# Patient Record
Sex: MALE | Race: WHITE
[De-identification: names, ages, dates, MRNs, and addresses within clinical notes are randomized per-mention and may not be internally consistent; named-entity substitution may affect disease eponyms.]

---

## 2017-07-20 ENCOUNTER — HOSPITAL ENCOUNTER (EMERGENCY)
Dept: HOSPITAL 62 - ER | Age: 42
Discharge: HOME | End: 2017-07-20
Payer: SELF-PAY

## 2017-07-20 VITALS — DIASTOLIC BLOOD PRESSURE: 79 MMHG | SYSTOLIC BLOOD PRESSURE: 115 MMHG

## 2017-07-20 DIAGNOSIS — Z79.899: ICD-10-CM

## 2017-07-20 DIAGNOSIS — F41.9: ICD-10-CM

## 2017-07-20 DIAGNOSIS — Z88.0: ICD-10-CM

## 2017-07-20 DIAGNOSIS — R07.89: ICD-10-CM

## 2017-07-20 DIAGNOSIS — G89.29: ICD-10-CM

## 2017-07-20 DIAGNOSIS — Z88.1: ICD-10-CM

## 2017-07-20 DIAGNOSIS — M54.9: ICD-10-CM

## 2017-07-20 DIAGNOSIS — R00.2: Primary | ICD-10-CM

## 2017-07-20 DIAGNOSIS — Z87.891: ICD-10-CM

## 2017-07-20 DIAGNOSIS — Z79.891: ICD-10-CM

## 2017-07-20 DIAGNOSIS — I10: ICD-10-CM

## 2017-07-20 LAB
ALBUMIN SERPL-MCNC: 4.9 G/DL (ref 3.5–5)
ALP SERPL-CCNC: 43 U/L (ref 38–126)
ALT SERPL-CCNC: 44 U/L (ref 21–72)
ANION GAP SERPL CALC-SCNC: 11 MMOL/L (ref 5–19)
AST SERPL-CCNC: 28 U/L (ref 17–59)
BASOPHILS # BLD AUTO: 0 10^3/UL (ref 0–0.2)
BASOPHILS NFR BLD AUTO: 0.3 % (ref 0–2)
BILIRUB DIRECT SERPL-MCNC: 0.3 MG/DL (ref 0–0.4)
BILIRUB SERPL-MCNC: 0.8 MG/DL (ref 0.2–1.3)
BUN SERPL-MCNC: 11 MG/DL (ref 7–20)
CALCIUM: 9.8 MG/DL (ref 8.4–10.2)
CHLORIDE SERPL-SCNC: 94 MMOL/L (ref 98–107)
CO2 SERPL-SCNC: 28 MMOL/L (ref 22–30)
CREAT SERPL-MCNC: 0.85 MG/DL (ref 0.52–1.25)
EOSINOPHIL # BLD AUTO: 0.2 10^3/UL (ref 0–0.6)
EOSINOPHIL NFR BLD AUTO: 2.5 % (ref 0–6)
ERYTHROCYTE [DISTWIDTH] IN BLOOD BY AUTOMATED COUNT: 12.5 % (ref 11.5–14)
GLUCOSE SERPL-MCNC: 122 MG/DL (ref 75–110)
HCT VFR BLD CALC: 39.9 % (ref 37.9–51)
HGB BLD-MCNC: 14 G/DL (ref 13.5–17)
HGB HCT DIFFERENCE: 2.1
LYMPHOCYTES # BLD AUTO: 2.1 10^3/UL (ref 0.5–4.7)
LYMPHOCYTES NFR BLD AUTO: 31.2 % (ref 13–45)
MCH RBC QN AUTO: 32.8 PG (ref 27–33.4)
MCHC RBC AUTO-ENTMCNC: 35 G/DL (ref 32–36)
MCV RBC AUTO: 94 FL (ref 80–97)
MONOCYTES # BLD AUTO: 0.6 10^3/UL (ref 0.1–1.4)
MONOCYTES NFR BLD AUTO: 9.1 % (ref 3–13)
NEUTROPHILS # BLD AUTO: 3.9 10^3/UL (ref 1.7–8.2)
NEUTS SEG NFR BLD AUTO: 56.9 % (ref 42–78)
POTASSIUM SERPL-SCNC: 4.2 MMOL/L (ref 3.6–5)
PROT SERPL-MCNC: 7.6 G/DL (ref 6.3–8.2)
RBC # BLD AUTO: 4.27 10^6/UL (ref 4.35–5.55)
SODIUM SERPL-SCNC: 133.4 MMOL/L (ref 137–145)
WBC # BLD AUTO: 6.8 10^3/UL (ref 4–10.5)

## 2017-07-20 PROCEDURE — 36415 COLL VENOUS BLD VENIPUNCTURE: CPT

## 2017-07-20 PROCEDURE — 99285 EMERGENCY DEPT VISIT HI MDM: CPT

## 2017-07-20 PROCEDURE — 93005 ELECTROCARDIOGRAM TRACING: CPT

## 2017-07-20 PROCEDURE — 93010 ELECTROCARDIOGRAM REPORT: CPT

## 2017-07-20 PROCEDURE — 84484 ASSAY OF TROPONIN QUANT: CPT

## 2017-07-20 PROCEDURE — 80053 COMPREHEN METABOLIC PANEL: CPT

## 2017-07-20 PROCEDURE — 85025 COMPLETE CBC W/AUTO DIFF WBC: CPT

## 2017-07-20 NOTE — ER DOCUMENT REPORT
ED Medical Screen (RME)





- General


Chief Complaint: Chest Pain


Stated Complaint: CHEST PAIN


Time Seen by Provider: 07/20/17 20:03


Notes: 


Patient presents with 30 minutes of central chest pain.  It does radiate to the 

neck and both arms.  Mild shortness of breath but no nausea or vomiting.  

Patient states he has a history of abnormal heart rhythms from low sodium in 

the past.  He states he has been told that his sodium is low due to taken 

diuretics and drinking too much water.  Patient states he did have palpitations 

several times with this pain before he arrived at the emergency department.


TRAVEL OUTSIDE OF THE [place] IN LAST 30 DAYS: No





- Related Data


Allergies/Adverse Reactions: 


 





cephalexin monohydrate [From Keflex] Allergy (Verified 07/20/17 19:46)


 


Penicillins Allergy (Verified 07/20/17 19:46)


 











Past Medical History





- Past Medical History


Cardiac Medical History: Reports: Hx Hypercholesterolemia, Hx Hypertension


Pulmonary Medical History: Reports: Hx Asthma - Pediatric


Neurological Medical History: Reports: Hx Migraine


Renal/ Medical History: Denies: Hx Peritoneal Dialysis


Psychiatric Medical History: Reports: Hx Anxiety


Past Surgical History: Reports: Hx Appendectomy, Hx Orthopedic Surgery - left 

ankle





- Immunizations


Immunizations up to date: Yes


Hx Diphtheria, Pertussis, Tetanus Vaccination: Yes





Physical Exam





- Vital signs


Vitals: 





 











Temp Pulse Resp BP Pulse Ox


 


 98.1 F   99   16   122/82   98 


 


 07/20/17 19:46  07/20/17 19:46  07/20/17 19:46  07/20/17 19:46  07/20/17 19:46














Course





- Vital Signs


Vital signs: 





 











Temp Pulse Resp BP Pulse Ox


 


 98.1 F   99   16   122/82   98 


 


 07/20/17 19:46  07/20/17 19:46  07/20/17 19:46  07/20/17 19:46  07/20/17 19:46

## 2017-07-20 NOTE — ER DOCUMENT REPORT
ED Cardiac





- General


Chief Complaint: Chest Pain


Stated Complaint: CHEST PAIN


Time Seen by Provider: 07/20/17 20:03


Mode of Arrival: Ambulatory


TRAVEL OUTSIDE OF THE [place] IN LAST 30 DAYS: No





- HPI


Notes: 


42-year-old male presents with a multitude of symptoms starting about 10-15 

minutes prior to arriving to the emergency department.  Initially described it 

as a pressing sensation above the suprasternal notch that was fairly rhythmic 

made it somewhat uncomfortable due to the pressure to his anterior neck but did 

not cause her speech difficulty.  Did have some mild swelling difficulty.  This 

seemed to resolve and he waited in the parking lot for a with friends while 

symptoms abated but recurred so he came and checked into the emergency 

department.  He denies any neck pain otherwise including no posterior neck pain 

but noted he had mild chest pressure.  He had a sensation that he felt like his 

heart would speed up slightly the beat harder and then would stop and restart.  

He had no syncope with this but did have one episode where he felt like he 

might pass out.  He does have a history of migraines anxiety and chronic back 

pain for which he takes Percocet and Xanax but states this felt different than 

that.  He has had multiple Holter monitors in the past, of which shows unclear 

etiology.  Sounds something like PVCs by what he describes.  He does have a 

history of hypertension but denies to me hypercholesterolemia, diabetes prior 

coronary artery disease.





- Related Data


Allergies/Adverse Reactions: 


 





cephalexin monohydrate [From Keflex] Allergy (Verified 07/20/17 19:46)


 


Penicillins Allergy (Verified 07/20/17 19:46)


 











Past Medical History





- Social History


Smoking Status: Former Smoker


Chew tobacco use (# tins/day): No


Frequency of alcohol use: None


Drug Abuse: None


Family History: Reviewed & Not Pertinent


Patient has suicidal ideation: No


Patient has homicidal ideation: No





- Past Medical History


Cardiac Medical History: Reports: Hx Hypercholesterolemia, Hx Hypertension


Pulmonary Medical History: Reports: Hx Asthma - Pediatric


Neurological Medical History: Reports: Hx Migraine


Renal/ Medical History: Denies: Hx Peritoneal Dialysis


Psychiatric Medical History: Reports: Hx Anxiety


Past Surgical History: Reports: Hx Appendectomy, Hx Orthopedic Surgery - left 

ankle





- Immunizations


Immunizations up to date: Yes


Hx Diphtheria, Pertussis, Tetanus Vaccination: Yes





Review of Systems





- Review of Systems


-: Yes All other systems reviewed and negative





Physical Exam





- Vital signs


Vitals: 


 











Temp Pulse Resp BP Pulse Ox


 


 98.1 F   99   16   122/82   98 


 


 07/20/17 19:46  07/20/17 19:46  07/20/17 19:46  07/20/17 19:46  07/20/17 19:46











Interpretation: Normal





- Notes


Notes: 


Physical Exam:





GENERAL: VS as per nursing doc. Well-appearing, well-nourished and in no acute 

distress.





HEAD: Atraumatic, normocephalic.





EYES: Pupils equal round and reactive to light, extraocular movements intact, 

sclera anicteric, no conjunctival injection or discharge.





ENT: Nares patent, oropharynx clear without exudates.  Moist mucous membranes.





NECK: Normal range of motion, supple without lymphadenopathy. No JVD. No 

Carotid Bruits.  Neck is nontender with no edema noted.  No thyromegaly





LUNGS: Breath sounds clear to auscultation bilaterally and equal.  No wheezes 

rales or rhonchi.





HEART: Normal S1S2. Regular rate and rhythm without murmurs. Equal peripheral 

pulses.  PMI is normal





ABDOMEN: Soft, non-tender.





EXTREMITIES: Normal range of motion 20. No calf tenderness. Negative Homans. 

No edema.





NEUROLOGICAL: Cranial nerves grossly intact.  Normal speech. Normal sensory and 

motor exams. No gross cerebellar abnormalities.





PSYCH: Normal mood, normal affect.





SKIN: Warm, dry, no cyanosis, no splinter hemorrhages. Cap refill < 2 sec.











Course





- Re-evaluation


Re-evalutation: 





07/20/17 22:51


I discussed with patient findings.  I observed some of his symptoms concurrent 

with PVCs on the monitor.  He has had no persisting symptoms and did not even 

notice some of those.  He thinks this is all related to stress with recent job 

change and no time off work.  He is asking to leave and get outpatient follow-

up if needed.  He agrees to return if worsening or for other concern.





- Vital Signs


Vital signs: 


 











Temp Pulse Resp BP Pulse Ox


 


 98.1 F   86   18   117/86 H  98 


 


 07/20/17 19:46  07/20/17 19:57  07/20/17 19:57  07/20/17 19:57  07/20/17 19:57














- Laboratory


Result Diagrams: 


 07/20/17 20:10





 07/20/17 20:10


Laboratory results interpreted by me: 


 











  07/20/17 07/20/17





  20:10 20:10


 


RBC  4.27 L 


 


Sodium   133.4 L


 


Chloride   94 L


 


Glucose   122 H














- EKG Interpretation by Me


EKG shows normal: Sinus rhythm - Heart rate 95.  There is no clear ischemia.  

Normal QRS.  Patient normal.


Rate: Normal


Rhythm: NSR


Heart block present: No: 1st Degree, Mobitz 1, Mobitz 2, CHB (3rd degree block)





Discharge





- Discharge


Clinical Impression: 


 Palpitations





Condition: Good


Disposition: HOME, SELF-CARE


Additional Instructions: 


Contact your physician for follow-up evaluation/treatment.  Return for 

worsening or concern.

## 2019-02-21 ENCOUNTER — HOSPITAL ENCOUNTER (EMERGENCY)
Dept: HOSPITAL 62 - ER | Age: 44
Discharge: LEFT BEFORE DISCHARGE | End: 2019-02-21
Payer: SELF-PAY

## 2019-02-21 VITALS — DIASTOLIC BLOOD PRESSURE: 95 MMHG | SYSTOLIC BLOOD PRESSURE: 141 MMHG

## 2019-02-21 DIAGNOSIS — I10: ICD-10-CM

## 2019-02-21 DIAGNOSIS — Z53.21: Primary | ICD-10-CM

## 2019-02-21 DIAGNOSIS — R05: ICD-10-CM

## 2019-02-21 DIAGNOSIS — R50.9: ICD-10-CM

## 2019-02-21 DIAGNOSIS — J45.909: ICD-10-CM

## 2019-02-21 DIAGNOSIS — R07.9: ICD-10-CM

## 2019-02-21 LAB
ADD MANUAL DIFF: NO
ALBUMIN SERPL-MCNC: 4.7 G/DL (ref 3.5–5)
ALP SERPL-CCNC: 46 U/L (ref 38–126)
ALT SERPL-CCNC: 20 U/L (ref 21–72)
ANION GAP SERPL CALC-SCNC: 14 MMOL/L (ref 5–19)
AST SERPL-CCNC: 24 U/L (ref 17–59)
BASOPHILS # BLD AUTO: 0 10^3/UL (ref 0–0.2)
BASOPHILS NFR BLD AUTO: 0.2 % (ref 0–2)
BILIRUB DIRECT SERPL-MCNC: 0.3 MG/DL (ref 0–0.4)
BILIRUB SERPL-MCNC: 0.9 MG/DL (ref 0.2–1.3)
BUN SERPL-MCNC: 10 MG/DL (ref 7–20)
CALCIUM: 9.2 MG/DL (ref 8.4–10.2)
CHLORIDE SERPL-SCNC: 89 MMOL/L (ref 98–107)
CO2 SERPL-SCNC: 29 MMOL/L (ref 22–30)
EOSINOPHIL # BLD AUTO: 0.1 10^3/UL (ref 0–0.6)
EOSINOPHIL NFR BLD AUTO: 0.8 % (ref 0–6)
ERYTHROCYTE [DISTWIDTH] IN BLOOD BY AUTOMATED COUNT: 12 % (ref 11.5–14)
GLUCOSE SERPL-MCNC: 103 MG/DL (ref 75–110)
HCT VFR BLD CALC: 36 % (ref 37.9–51)
HGB BLD-MCNC: 12.9 G/DL (ref 13.5–17)
LYMPHOCYTES # BLD AUTO: 0.6 10^3/UL (ref 0.5–4.7)
LYMPHOCYTES NFR BLD AUTO: 8 % (ref 13–45)
MCH RBC QN AUTO: 32.7 PG (ref 27–33.4)
MCHC RBC AUTO-ENTMCNC: 35.8 G/DL (ref 32–36)
MCV RBC AUTO: 92 FL (ref 80–97)
MONOCYTES # BLD AUTO: 0.8 10^3/UL (ref 0.1–1.4)
MONOCYTES NFR BLD AUTO: 11.3 % (ref 3–13)
NEUTROPHILS # BLD AUTO: 5.7 10^3/UL (ref 1.7–8.2)
NEUTS SEG NFR BLD AUTO: 79.7 % (ref 42–78)
PLATELET # BLD: 206 10^3/UL (ref 150–450)
POTASSIUM SERPL-SCNC: 3.7 MMOL/L (ref 3.6–5)
PROT SERPL-MCNC: 7 G/DL (ref 6.3–8.2)
RBC # BLD AUTO: 3.93 10^6/UL (ref 4.35–5.55)
SODIUM SERPL-SCNC: 131.5 MMOL/L (ref 137–145)
TOTAL CELLS COUNTED % (AUTO): 100 %
WBC # BLD AUTO: 7.1 10^3/UL (ref 4–10.5)

## 2019-02-21 PROCEDURE — 85025 COMPLETE CBC W/AUTO DIFF WBC: CPT

## 2019-02-21 PROCEDURE — 80053 COMPREHEN METABOLIC PANEL: CPT

## 2019-02-21 PROCEDURE — 93010 ELECTROCARDIOGRAM REPORT: CPT

## 2019-02-21 PROCEDURE — 84484 ASSAY OF TROPONIN QUANT: CPT

## 2019-02-21 PROCEDURE — 93005 ELECTROCARDIOGRAM TRACING: CPT

## 2019-02-21 PROCEDURE — 36415 COLL VENOUS BLD VENIPUNCTURE: CPT

## 2019-02-21 PROCEDURE — 99281 EMR DPT VST MAYX REQ PHY/QHP: CPT

## 2019-02-21 NOTE — EKG REPORT
SEVERITY:- OTHERWISE NORMAL ECG -

SINUS TACHYCARDIA

:

Confirmed by: Shakeel Hartman MD 21-Feb-2019 07:45:20

## 2019-02-21 NOTE — ER DOCUMENT REPORT
ED Medical Screen (RME)





- General


Chief Complaint: Fever


Stated Complaint: FEVER


Time Seen by Provider: 02/21/19 01:32


Notes: 


44-year-old  male coming in today chief complaint of midsternal chest 

pain, bad cough, fever, bringing up ugly sputum.  No vomiting or diarrhea.  

Denies smoking and drug use.





I have treated and performed a rapid initial assessment of this patient.  A 

comprehensive ED assessment and evaluation of the patient, analysis of test 

results and completion of medical decision making process will be conducted by 

additional ED providers.





PHYSICAL EXAMINATION:





GENERAL: Well-appearing, well-nourished and in no acute distress.  A&Ox4.  

Answers questions appropriately.





LUNGS: Breath sounds mildly diminished.  No wheezes rales or rhonchi.





HEART: Regular rate and rhythm without murmurs, rubs, gallops.





ABDOMEN: Soft, nondistended abdomen.  No guarding, no rebound.  Normal bowel 

sounds present.





Extremities:  No cyanosis, clubbing, or edema b/l.  





NEUROLOGICAL: Normal speech, normal gait. 





PSYCH: Normal mood, normal affect.





TRAVEL OUTSIDE OF THE [place] IN LAST 30 DAYS: No





- Related Data


Allergies/Adverse Reactions: 


                                        





cephalexin monohydrate [From Keflex] Allergy (Verified 07/20/17 19:46)


   


Penicillins Allergy (Verified 07/20/17 19:46)


   











Past Medical History





- Social History


Chew tobacco use (# tins/day): No


Frequency of alcohol use: Occasional


Drug Abuse: None





- Past Medical History


Cardiac Medical History: Reports: Hx Hypercholesterolemia, Hx Hypertension


Pulmonary Medical History: Reports: Hx Asthma - Pediatric


Neurological Medical History: Reports: Hx Migraine


Renal/ Medical History: Denies: Hx Peritoneal Dialysis


Psychiatric Medical History: Reports: Hx Anxiety


Past Surgical History: Reports: Hx Appendectomy, Hx Orthopedic Surgery - left 

ankle





- Immunizations


Immunizations up to date: Yes


Hx Diphtheria, Pertussis, Tetanus Vaccination: Yes





Physical Exam





- Vital signs


Vitals: 





                                        











Temp Pulse Resp BP Pulse Ox


 


 101.1 F H  110 H  20   141/95 H  98 


 


 02/21/19 00:53  02/21/19 00:53  02/21/19 00:53  02/21/19 00:53  02/21/19 00:53














Course





- Vital Signs


Vital signs: 





                                        











Temp Pulse Resp BP Pulse Ox


 


 101.1 F H  110 H  20   141/95 H  98 


 


 02/21/19 00:53  02/21/19 00:53  02/21/19 00:53  02/21/19 00:53  02/21/19 00:53